# Patient Record
Sex: FEMALE | Race: BLACK OR AFRICAN AMERICAN | NOT HISPANIC OR LATINO
[De-identification: names, ages, dates, MRNs, and addresses within clinical notes are randomized per-mention and may not be internally consistent; named-entity substitution may affect disease eponyms.]

---

## 2019-12-11 ENCOUNTER — APPOINTMENT (OUTPATIENT)
Dept: OBGYN | Facility: CLINIC | Age: 44
End: 2019-12-11
Payer: COMMERCIAL

## 2019-12-11 DIAGNOSIS — I10 ESSENTIAL (PRIMARY) HYPERTENSION: ICD-10-CM

## 2019-12-11 DIAGNOSIS — Z86.79 PERSONAL HISTORY OF OTHER DISEASES OF THE CIRCULATORY SYSTEM: ICD-10-CM

## 2019-12-11 DIAGNOSIS — Z80.3 FAMILY HISTORY OF MALIGNANT NEOPLASM OF BREAST: ICD-10-CM

## 2019-12-11 PROCEDURE — 99203 OFFICE O/P NEW LOW 30 MIN: CPT

## 2020-01-03 PROBLEM — Z80.3 FAMILY HISTORY OF MALIGNANT NEOPLASM OF BREAST: Status: ACTIVE | Noted: 2020-01-03

## 2020-01-03 PROBLEM — Z86.79 HISTORY OF HYPERTENSION: Status: RESOLVED | Noted: 2020-01-03 | Resolved: 2020-01-03

## 2020-01-03 NOTE — HISTORY OF PRESENT ILLNESS
[Last Mammogram ___] : Last Mammogram was [unfilled] [Last Pap ___] : Last cervical pap smear was [unfilled] [Reproductive Age] : is of reproductive age [Definite:  ___ (Date)] : the last menstrual period was [unfilled] [Normal Amount/Duration] : was of a normal amount and duration [Spotting Between  Menses] : no spotting between menses [Regular Cycle Intervals] : periods have been regular [Menstrual Cramps] : no menstrual cramps [Sexually Active] : is not sexually active

## 2020-01-23 ENCOUNTER — APPOINTMENT (OUTPATIENT)
Dept: INTERVENTIONAL RADIOLOGY/VASCULAR | Facility: HOSPITAL | Age: 45
End: 2020-01-23
Payer: COMMERCIAL

## 2020-01-23 VITALS
HEIGHT: 62 IN | BODY MASS INDEX: 52.44 KG/M2 | SYSTOLIC BLOOD PRESSURE: 115 MMHG | HEART RATE: 66 BPM | DIASTOLIC BLOOD PRESSURE: 70 MMHG | RESPIRATION RATE: 20 BRPM | WEIGHT: 285 LBS

## 2020-01-23 PROCEDURE — 99242 OFF/OP CONSLTJ NEW/EST SF 20: CPT

## 2020-01-23 RX ORDER — FUROSEMIDE 20 MG/1
20 TABLET ORAL
Refills: 0 | Status: ACTIVE | COMMUNITY

## 2020-01-23 RX ORDER — TELMISARTAN 80 MG/1
80 TABLET ORAL
Refills: 0 | Status: ACTIVE | COMMUNITY

## 2020-01-23 RX ORDER — HYDROCHLOROTHIAZIDE 25 MG/1
25 TABLET ORAL
Refills: 0 | Status: ACTIVE | COMMUNITY

## 2020-01-23 NOTE — PHYSICAL EXAM
[Alert] : alert [No Acute Distress] : no acute distress [Well Nourished] : well nourished [Well Developed] : well developed [Normal Oropharynx] : the oropharynx was normal [No Thyroid Nodules] : there were no palpable thyroid nodules [No Edema] : there was no peripheral edema [Not Distended] : not distended [Normal Anterior Cervical Nodes] : anterior cervical nodes [Normal Post Cervical Nodes] : posterior cervical nodes [No Spinal Tenderness] : no spinal tenderness [Spine Straight] : spine straight [No Rash] : no rash [Acanthosis Nigricans___] : no acanthosis nigricans [No Tremors] : no tremors [Fully active, able to carry on all pre-disease performance without restriction] : Fully active, able to carry on all pre-disease performance without restriction

## 2020-01-23 NOTE — CONSULT LETTER
[Consult Letter:] : I had the pleasure of evaluating your patient, [unfilled]. [Dear  ___] : Dear  [unfilled], [Please see my note below.] : Please see my note below. [Consult Closing:] : Thank you very much for allowing me to participate in the care of this patient.  If you have any questions, please do not hesitate to contact me. [Sincerely,] : Sincerely, [FreeTextEntry3] : Markus Manzanares MD, FSIR\par Chief Interventional Radiology\par [facility]\par [facility]\par

## 2020-01-23 NOTE — HISTORY OF PRESENT ILLNESS
[Pressure] : pressure [Bloating] : bloating [Last Menstrual Period (___)] : Last menstrual period [unfilled] [Monthly Cycles Regular] : monthly cycles are regular [P___] : P [unfilled] [G ___] : G [unfilled] [FreeTextEntry1] : Ms Reardon is a 44 year old woman first diagnosed with fibroids seven years ago on MRI.   No symptoms at the time.  No heavy periods or cramps.  Mainly bulk symptoms including abdominal distention, pelvic heaviness.  Concerned mainly about the size of the fibroids and the increase in size of the uterus.  Does not want hysterectomy at this time. [Dysmenorrhea] : no dysmenorrhea [Menorrhagia] : no ~T menorrhagia [Pelvic Pain] : no pelvic pain [Urinary Frequency] : no change in urinary frequency [NSAID] : no non-steroidal anti-inflammatory drug [Oral Contraceptives] : no oral contraceptives [Progesterone] : no progesterone [Narcotics] : no use of narcotics [Myomectomy] : no myomectomy [UAE] : no uterine artery embolization [Hysteroscopy] : no hysteroscopy [D & C] : no dilation and curettage [Endometrial Ablation] : no endometrial ablation [Lesions/ Discharge] : no lesions and or discharge [Bleeding In Between Periods] : no bleeding in between periods [Menopausal Symptoms] : no complaints of menopausal symptoms [Endometriosis] : no endometriosis [Pelvic Adhesions] : no pelvic adhesions

## 2020-01-23 NOTE — ASSESSMENT
[FreeTextEntry1] : Ms Reardon unsure if she wants to have UAE after lengthy discussion.  Symptoms are mild.

## 2020-10-09 ENCOUNTER — RESULT REVIEW (OUTPATIENT)
Age: 45
End: 2020-10-09

## 2021-10-06 PROBLEM — I10 ESSENTIAL HYPERTENSION: Status: ACTIVE | Noted: 2020-01-03

## 2022-01-25 ENCOUNTER — RESULT REVIEW (OUTPATIENT)
Age: 47
End: 2022-01-25

## 2023-04-05 ENCOUNTER — APPOINTMENT (OUTPATIENT)
Dept: INTERVENTIONAL RADIOLOGY/VASCULAR | Facility: HOSPITAL | Age: 48
End: 2023-04-05
Payer: COMMERCIAL

## 2023-04-05 DIAGNOSIS — D21.9 BENIGN NEOPLASM OF CONNECTIVE AND OTHER SOFT TISSUE, UNSPECIFIED: ICD-10-CM

## 2023-04-05 PROCEDURE — 99214 OFFICE O/P EST MOD 30 MIN: CPT | Mod: 95

## 2023-04-06 PROBLEM — D21.9 FIBROIDS: Status: ACTIVE | Noted: 2019-12-11

## 2023-04-06 NOTE — CONSULT LETTER
[Dear  ___] : Dear  [unfilled], [Consult Letter:] : I had the pleasure of evaluating your patient, [unfilled]. [Please see my note below.] : Please see my note below. [Consult Closing:] : Thank you very much for allowing me to participate in the care of this patient.  If you have any questions, please do not hesitate to contact me. [Sincerely,] : Sincerely, [FreeTextEntry3] : Markus Manzanares MD, FSIR\par Chief Interventional Radiology\par [facility]\par [facility]\par

## 2023-04-06 NOTE — HISTORY OF PRESENT ILLNESS
[FreeTextEntry1] : 48 year old  with long history of uterine fibroids.  Primary complaint is abdominal distention, early satiety and pelvic discomfort/menstrual cramps.  Denies menorrhagia.  MRI shows multi-fibroid enlarged uterus and bilateral hydroureteronephrosis.  Patient has had several previous renal calculi which have required removal.  Pap negative. Consultation for possible fibroid embolization [Home] : at home, [unfilled] , at the time of the visit. [Medical Office: ([facility])___] : at the medical office located in  [Verbal consent obtained from patient] : the patient, [unfilled] [Pelvic Pain] : pelvic pain [Pressure] : pressure [Bloating] : bloating [Last Menstrual Period (___)] : Last menstrual period [unfilled] [G ___] : G [unfilled] [P___] : P [unfilled] [Plans for future pregnancies within 2 years] : does not plan to have future pregnancies within 2 years [Surgically Sterile] : not surgically sterile

## 2023-04-06 NOTE — ASSESSMENT
[FreeTextEntry1] : 48 year old with bulk symptoms related to uterine fibroids.  Discussed with Dr Torres.  In addition to symptoms concern regarding significant bilateral ureteral obstruction given history of renal calculi.  Plan for UAE and if symptoms resolve and hydro alleviated no further treatment.  If not then possible hysterectomy

## 2023-04-12 ENCOUNTER — LABORATORY RESULT (OUTPATIENT)
Age: 48
End: 2023-04-12

## 2023-04-27 ENCOUNTER — OUTPATIENT (OUTPATIENT)
Dept: OUTPATIENT SERVICES | Facility: HOSPITAL | Age: 48
LOS: 1 days | End: 2023-04-27
Payer: COMMERCIAL

## 2023-04-27 ENCOUNTER — APPOINTMENT (OUTPATIENT)
Dept: INTERVENTIONAL RADIOLOGY/VASCULAR | Facility: HOSPITAL | Age: 48
End: 2023-04-27
Payer: COMMERCIAL

## 2023-04-27 ENCOUNTER — RESULT REVIEW (OUTPATIENT)
Age: 48
End: 2023-04-27

## 2023-04-27 VITALS
DIASTOLIC BLOOD PRESSURE: 89 MMHG | OXYGEN SATURATION: 99 % | HEIGHT: 62 IN | RESPIRATION RATE: 20 BRPM | HEART RATE: 78 BPM | TEMPERATURE: 99 F | WEIGHT: 270.07 LBS | SYSTOLIC BLOOD PRESSURE: 150 MMHG

## 2023-04-27 PROCEDURE — C1889: CPT

## 2023-04-27 PROCEDURE — 36247 INS CATH ABD/L-EXT ART 3RD: CPT

## 2023-04-27 PROCEDURE — 37243 VASC EMBOLIZE/OCCLUDE ORGAN: CPT

## 2023-04-27 PROCEDURE — C1887: CPT

## 2023-04-27 PROCEDURE — C1894: CPT

## 2023-04-27 PROCEDURE — C1769: CPT

## 2023-04-27 PROCEDURE — 36247 INS CATH ABD/L-EXT ART 3RD: CPT | Mod: 59

## 2023-04-27 RX ORDER — ONDANSETRON 8 MG/1
1 TABLET, FILM COATED ORAL
Qty: 9 | Refills: 0
Start: 2023-04-27 | End: 2023-04-29

## 2023-04-27 RX ORDER — OXYCODONE HYDROCHLORIDE 5 MG/1
1 TABLET ORAL
Qty: 20 | Refills: 0
Start: 2023-04-27 | End: 2023-05-01

## 2023-04-27 NOTE — PRE-ANESTHESIA EVALUATION ADULT - NSANTHOSAYNRD_GEN_A_CORE
No. SEN screening performed.  STOP BANG Legend: 0-2 = LOW Risk; 3-4 = INTERMEDIATE Risk; 5-8 = HIGH Risk

## 2023-05-18 ENCOUNTER — APPOINTMENT (OUTPATIENT)
Dept: INTERVENTIONAL RADIOLOGY/VASCULAR | Facility: HOSPITAL | Age: 48
End: 2023-05-18
Payer: COMMERCIAL

## 2023-05-18 ENCOUNTER — TRANSCRIPTION ENCOUNTER (OUTPATIENT)
Age: 48
End: 2023-05-18

## 2023-05-18 PROCEDURE — XXXXX: CPT

## 2023-08-21 ENCOUNTER — APPOINTMENT (OUTPATIENT)
Dept: MRI IMAGING | Facility: HOSPITAL | Age: 48
End: 2023-08-21

## 2023-09-25 ENCOUNTER — APPOINTMENT (OUTPATIENT)
Dept: INTERVENTIONAL RADIOLOGY/VASCULAR | Facility: HOSPITAL | Age: 48
End: 2023-09-25